# Patient Record
Sex: FEMALE | Race: WHITE | NOT HISPANIC OR LATINO | Employment: FULL TIME | URBAN - METROPOLITAN AREA
[De-identification: names, ages, dates, MRNs, and addresses within clinical notes are randomized per-mention and may not be internally consistent; named-entity substitution may affect disease eponyms.]

---

## 2024-07-24 ENCOUNTER — OFFICE VISIT (OUTPATIENT)
Dept: FAMILY MEDICINE CLINIC | Facility: CLINIC | Age: 44
End: 2024-07-24
Payer: COMMERCIAL

## 2024-07-24 VITALS
HEART RATE: 93 BPM | OXYGEN SATURATION: 96 % | HEIGHT: 67 IN | SYSTOLIC BLOOD PRESSURE: 126 MMHG | BODY MASS INDEX: 34.53 KG/M2 | WEIGHT: 220 LBS | DIASTOLIC BLOOD PRESSURE: 88 MMHG

## 2024-07-24 DIAGNOSIS — R31.9 HEMATURIA, UNSPECIFIED TYPE: ICD-10-CM

## 2024-07-24 DIAGNOSIS — M53.86 DECREASED RANGE OF MOTION OF LUMBAR SPINE: ICD-10-CM

## 2024-07-24 DIAGNOSIS — M62.830 MUSCLE SPASM OF BACK: ICD-10-CM

## 2024-07-24 DIAGNOSIS — Z00.00 HEALTHCARE MAINTENANCE: ICD-10-CM

## 2024-07-24 DIAGNOSIS — Z13.220 SCREENING FOR LIPID DISORDERS: ICD-10-CM

## 2024-07-24 DIAGNOSIS — F32.0 CURRENT MILD EPISODE OF MAJOR DEPRESSIVE DISORDER WITHOUT PRIOR EPISODE (HCC): ICD-10-CM

## 2024-07-24 DIAGNOSIS — M54.50 LOW BACK PAIN, UNSPECIFIED BACK PAIN LATERALITY, UNSPECIFIED CHRONICITY, UNSPECIFIED WHETHER SCIATICA PRESENT: ICD-10-CM

## 2024-07-24 DIAGNOSIS — E66.9 OBESITY (BMI 30-39.9): ICD-10-CM

## 2024-07-24 DIAGNOSIS — Z13.31 POSITIVE DEPRESSION SCREENING: ICD-10-CM

## 2024-07-24 DIAGNOSIS — Z11.4 SCREENING FOR HIV (HUMAN IMMUNODEFICIENCY VIRUS): ICD-10-CM

## 2024-07-24 DIAGNOSIS — Z83.3 FAMILY HISTORY OF DIABETES MELLITUS (DM): Primary | ICD-10-CM

## 2024-07-24 DIAGNOSIS — Z11.59 NEED FOR HEPATITIS C SCREENING TEST: ICD-10-CM

## 2024-07-24 DIAGNOSIS — Z12.31 SCREENING MAMMOGRAM FOR BREAST CANCER: ICD-10-CM

## 2024-07-24 LAB
SL AMB  POCT GLUCOSE, UA: NEGATIVE
SL AMB LEUKOCYTE ESTERASE,UA: NEGATIVE
SL AMB POCT BILIRUBIN,UA: NEGATIVE
SL AMB POCT BLOOD,UA: ABNORMAL
SL AMB POCT CLARITY,UA: ABNORMAL
SL AMB POCT COLOR,UA: YELLOW
SL AMB POCT KETONES,UA: NEGATIVE
SL AMB POCT NITRITE,UA: NEGATIVE
SL AMB POCT PH,UA: 5.5
SL AMB POCT SPECIFIC GRAVITY,UA: >1.03
SL AMB POCT URINE PROTEIN: NEGATIVE
SL AMB POCT UROBILINOGEN: 1

## 2024-07-24 PROCEDURE — 81002 URINALYSIS NONAUTO W/O SCOPE: CPT | Performed by: FAMILY MEDICINE

## 2024-07-24 PROCEDURE — 99205 OFFICE O/P NEW HI 60 MIN: CPT | Performed by: FAMILY MEDICINE

## 2024-07-24 PROCEDURE — 87086 URINE CULTURE/COLONY COUNT: CPT | Performed by: FAMILY MEDICINE

## 2024-07-24 RX ORDER — DICLOFENAC POTASSIUM 50 MG/1
TABLET, FILM COATED ORAL
Qty: 30 TABLET | Refills: 1 | Status: SHIPPED | OUTPATIENT
Start: 2024-07-24

## 2024-07-24 RX ORDER — METHOCARBAMOL 500 MG/1
500 TABLET, FILM COATED ORAL 4 TIMES DAILY
COMMUNITY
Start: 2024-06-24 | End: 2024-07-24

## 2024-07-24 RX ORDER — DULOXETIN HYDROCHLORIDE 30 MG/1
30 CAPSULE, DELAYED RELEASE ORAL DAILY
Qty: 30 CAPSULE | Refills: 5 | Status: SHIPPED | OUTPATIENT
Start: 2024-07-24

## 2024-07-24 RX ORDER — TIZANIDINE 4 MG/1
4 TABLET ORAL EVERY 8 HOURS PRN
Qty: 30 TABLET | Refills: 1 | Status: SHIPPED | OUTPATIENT
Start: 2024-07-24

## 2024-07-24 NOTE — PATIENT INSTRUCTIONS
Complete CT of abdomen pelvis rule out stone  Start Cymbalta 30 mg 1 daily to help mood and muscle pain  Use Cataflam/diclofenac 3 times a day with food as needed for pain  Use tizanidine 4 mg 3 times daily/every 8 hours as needed for muscle spasm.  This may cause drowsiness do not drive or operate machinery until side effect is known  Follow-up physical therapy complete blood work as ordered may hold on physical therapy to see if this is caused by a kidney stone  Complete mammography as ordered  Recheck 2 weeks sooner if needed

## 2024-07-24 NOTE — ASSESSMENT & PLAN NOTE
Discussed pathophysiology of depression.  Will start Cymbalta risk-benefit discussed specially increased risk of suicidal ideation recheck 2 weeks   REPORT GIVEN TO ABHI SOLOMON.

## 2024-07-24 NOTE — PROGRESS NOTES
Ambulatory Visit  Name: Paola Luna      : 1980      MRN: [id number]  Encounter Provider: Dutch Mcgarry DO  Encounter Date: 2024   Encounter department: [facility] PRIMARY CARE  Plan  #1.  Family history of diabetes in mother  Blood work is ordered  2.  BMI 34.46 diet exercise weight loss recommended TSH is ordered  3.  Healthcare maintenance discussed screening for hepatitis C, HIV, lipid, orders placed  4.  Breast cancer screening, mammography ordered  5.  Low back pain  6.  Mild spasm of back  Cataflam as ordered GI side effects discussed  Tizanidine is ordered drowsiness discussed  Lumbar spine x-rays ordered  Refer to physical therapy  Urinalysis completed in office today  7.  Positive depression screen will monitor  8.  MDD, negative SI/HI, will start Cymbalta risk and benefit discussed especially increased risk of suicidal ideation.  Pathophysiology of depression discussed  9  Breast cancer screening, mammography is ordered.  Explained that Cymbalta will help her mood/depression will also help muscle pain  10  Hematuria with above discontinue x-ray will change to CT stone study and check culture  11  Recheck 4 weeks sooner if needed patient to call if not improving the next 1 to 2 weeks      Assessment & Plan   1. Family history of diabetes mellitus (DM)  -     CBC; Future  -     Comprehensive metabolic panel  -     Lipid Panel with Direct LDL reflex  -     TSH, 3rd generation with Free T4 reflex  2. Obesity (BMI 30-39.9)  Assessment & Plan:  BMI 34.46 TSH is ordered  Orders:  -     CBC; Future  -     Comprehensive metabolic panel  -     Lipid Panel with Direct LDL reflex  -     TSH, 3rd generation with Free T4 reflex  3. Healthcare maintenance  Assessment & Plan:  Discussed hepatitis C/HIV/lipid screening, orders placed  Orders:  -     CBC; Future  -     Comprehensive metabolic panel  -     Lipid Panel with Direct LDL reflex  -     TSH, 3rd generation with Free T4 reflex  4.  Screening mammogram for breast cancer  -     Mammo screening bilateral w 3d & cad; Future  -     CBC; Future  -     Comprehensive metabolic panel  -     Lipid Panel with Direct LDL reflex  -     TSH, 3rd generation with Free T4 reflex  5. Low back pain, unspecified back pain laterality, unspecified chronicity, unspecified whether sciatica present  -     POCT urine dip  -     CBC; Future  -     Comprehensive metabolic panel  -     Lipid Panel with Direct LDL reflex  -     TSH, 3rd generation with Free T4 reflex  -     diclofenac potassium (CATAFLAM) 50 mg tablet; Take 1 tablet 3 times daily with food as needed for back pain  -     Ambulatory referral to Physical Therapy; Future  -     CT renal stone study abdomen pelvis wo contrast; Future; Expected date: 07/24/2024  -     Urine culture; Future  -     Urine culture  6. Muscle spasm of back  -     CBC; Future  -     Comprehensive metabolic panel  -     Lipid Panel with Direct LDL reflex  -     TSH, 3rd generation with Free T4 reflex  -     tiZANidine (ZANAFLEX) 4 mg tablet; Take 1 tablet (4 mg total) by mouth every 8 (eight) hours as needed for muscle spasms  -     Ambulatory referral to Physical Therapy; Future  7. Decreased range of motion of lumbar spine  -     CBC; Future  -     Comprehensive metabolic panel  -     Lipid Panel with Direct LDL reflex  -     TSH, 3rd generation with Free T4 reflex  8. Positive depression screening  -     CBC; Future  -     Comprehensive metabolic panel  -     Lipid Panel with Direct LDL reflex  -     TSH, 3rd generation with Free T4 reflex  9. Need for hepatitis C screening test  -     Hepatitis C Antibody; Future  -     CBC; Future  -     Comprehensive metabolic panel  -     Lipid Panel with Direct LDL reflex  -     TSH, 3rd generation with Free T4 reflex  10. Screening for HIV (human immunodeficiency virus)  -     HIV 1/2 AG/AB w Reflex [facility] for 2 yr old and above; Future  -     CBC; Future  -     Comprehensive metabolic panel  -      Lipid Panel with Direct LDL reflex  -     TSH, 3rd generation with Free T4 reflex  11. Screening for lipid disorders  -     CBC; Future  -     Comprehensive metabolic panel  -     Lipid Panel with Direct LDL reflex  -     TSH, 3rd generation with Free T4 reflex  12. Hematuria, unspecified type  -     CT renal stone study abdomen pelvis wo contrast; Future; Expected date: 07/24/2024  -     Urine culture; Future  -     Urine culture  13. Current mild episode of major depressive disorder without prior episode (HCC)  Assessment & Plan:  Discussed pathophysiology of depression.  Will start Cymbalta risk-benefit discussed specially increased risk of suicidal ideation recheck 2 weeks  Orders:  -     DULoxetine (CYMBALTA) 30 mg delayed release capsule; Take 1 capsule (30 mg total) by mouth daily    Depression Screening and Follow-up Plan: Patient's depression screening was positive with a PHQ-2 score of 3. Their PHQ-9 score was 12.       History of Present Illness     Patient here to establish herself as a patient as office.  Patient said low back pain for well over a month.  Patient did see urgent care 2 to 3 weeks ago was placed on Robaxin for few days without relief.  No other workup was done at this point.  Patient's lumbosacral pain is without radiation no leg pain paresthesias numbness.  Patient has no antecedent history of trauma.        Review of Systems   Constitutional: Negative.    HENT: Negative.     Eyes: Negative.    Respiratory: Negative.     Cardiovascular: Negative.    Gastrointestinal: Negative.    Endocrine:        Normal history of diabetes in mother   Musculoskeletal:         HPI   Skin: Negative.    Allergic/Immunologic: Negative.    Neurological:         HPI   Hematological: Negative.    Psychiatric/Behavioral:          Positive depression screen, mildly depressed and tearful       Objective     /88 (BP Location: Right arm, Patient Position: Sitting, Cuff Size: Standard)   Pulse 93   Ht 5'  "7\" (1.702 m)   Wt 99.8 kg (220 lb)   SpO2 96%   BMI 34.46 kg/m²     Physical Exam  Vitals and nursing note reviewed.   Constitutional:       Appearance: Normal appearance. She is obese.   HENT:      Head: Normocephalic and atraumatic.      Right Ear: Tympanic membrane normal.      Left Ear: Tympanic membrane normal.      Nose: Nose normal.      Mouth/Throat:      Mouth: Mucous membranes are moist.      Pharynx: Oropharynx is clear. No oropharyngeal exudate or posterior oropharyngeal erythema.   Eyes:      General: No scleral icterus.        Right eye: No discharge.         Left eye: No discharge.      Extraocular Movements: Extraocular movements intact.      Conjunctiva/sclera: Conjunctivae normal.      Pupils: Pupils are equal, round, and reactive to light.   Neck:      Vascular: No carotid bruit.   Cardiovascular:      Rate and Rhythm: Normal rate and regular rhythm.      Heart sounds: Normal heart sounds.   Pulmonary:      Breath sounds: Normal breath sounds.   Abdominal:      General: Bowel sounds are normal. There is no distension.      Palpations: Abdomen is soft. There is no mass.      Tenderness: There is no abdominal tenderness. There is no right CVA tenderness, left CVA tenderness, guarding or rebound.      Hernia: No hernia is present.   Musculoskeletal:         General: No swelling, tenderness, deformity or signs of injury.      Cervical back: Neck supple. No rigidity or tenderness.      Right lower leg: No edema.      Left lower leg: No edema.      Comments: Positive paravertebral muscle contraction low thoracic lumbar and sacral areas positive mild spasm of erector spinae and latissimus dorsi musculature decreased range of motion with flexion of spine and lumbar area   Lymphadenopathy:      Cervical: No cervical adenopathy.   Skin:     General: Skin is warm and dry.   Neurological:      General: No focal deficit present.      Mental Status: She is alert and oriented to person, place, and time.      " Cranial Nerves: No cranial nerve deficit.      Motor: No weakness.      Gait: Gait normal.   Psychiatric:         Mood and Affect: Mood normal.       Administrative Statements   Depression Screening Follow-up Plan: Patient's depression screening was positive with a PHQ-2 score of 3. Their PHQ-9 score was 12. Patient advised to follow-up with PCP for further management.

## 2024-07-25 ENCOUNTER — APPOINTMENT (OUTPATIENT)
Dept: LAB | Facility: CLINIC | Age: 44
End: 2024-07-25
Payer: COMMERCIAL

## 2024-07-25 ENCOUNTER — HOSPITAL ENCOUNTER (OUTPATIENT)
Dept: CT IMAGING | Facility: HOSPITAL | Age: 44
Discharge: HOME | End: 2024-07-25
Payer: COMMERCIAL

## 2024-07-25 ENCOUNTER — TELEPHONE (OUTPATIENT)
Dept: FAMILY MEDICINE CLINIC | Facility: CLINIC | Age: 44
End: 2024-07-25

## 2024-07-25 DIAGNOSIS — M54.50 LOW BACK PAIN, UNSPECIFIED BACK PAIN LATERALITY, UNSPECIFIED CHRONICITY, UNSPECIFIED WHETHER SCIATICA PRESENT: ICD-10-CM

## 2024-07-25 DIAGNOSIS — Z13.31 POSITIVE DEPRESSION SCREENING: ICD-10-CM

## 2024-07-25 DIAGNOSIS — Z83.3 FAMILY HISTORY OF DIABETES MELLITUS (DM): ICD-10-CM

## 2024-07-25 DIAGNOSIS — Z11.59 NEED FOR HEPATITIS C SCREENING TEST: ICD-10-CM

## 2024-07-25 DIAGNOSIS — R31.9 HEMATURIA, UNSPECIFIED TYPE: ICD-10-CM

## 2024-07-25 DIAGNOSIS — M53.86 DECREASED RANGE OF MOTION OF LUMBAR SPINE: ICD-10-CM

## 2024-07-25 DIAGNOSIS — Z12.31 SCREENING MAMMOGRAM FOR BREAST CANCER: ICD-10-CM

## 2024-07-25 DIAGNOSIS — M62.830 MUSCLE SPASM OF BACK: ICD-10-CM

## 2024-07-25 DIAGNOSIS — Z00.00 HEALTHCARE MAINTENANCE: ICD-10-CM

## 2024-07-25 DIAGNOSIS — Z13.220 SCREENING FOR LIPID DISORDERS: ICD-10-CM

## 2024-07-25 DIAGNOSIS — Z11.4 SCREENING FOR HIV (HUMAN IMMUNODEFICIENCY VIRUS): ICD-10-CM

## 2024-07-25 DIAGNOSIS — E66.9 OBESITY (BMI 30-39.9): ICD-10-CM

## 2024-07-25 LAB
ALBUMIN SERPL BCG-MCNC: 4.1 G/DL (ref 3.5–5)
ALP SERPL-CCNC: 55 U/L (ref 34–104)
ALT SERPL W P-5'-P-CCNC: 31 U/L (ref 7–52)
ANION GAP SERPL CALCULATED.3IONS-SCNC: 5 MMOL/L (ref 4–13)
AST SERPL W P-5'-P-CCNC: 23 U/L (ref 13–39)
BACTERIA UR CULT: NORMAL
BILIRUB SERPL-MCNC: 0.75 MG/DL (ref 0.2–1)
BUN SERPL-MCNC: 7 MG/DL (ref 5–25)
CALCIUM SERPL-MCNC: 8.8 MG/DL (ref 8.4–10.2)
CHLORIDE SERPL-SCNC: 105 MMOL/L (ref 96–108)
CHOLEST SERPL-MCNC: 159 MG/DL
CO2 SERPL-SCNC: 26 MMOL/L (ref 21–32)
CREAT SERPL-MCNC: 0.83 MG/DL (ref 0.6–1.3)
ERYTHROCYTE [DISTWIDTH] IN BLOOD BY AUTOMATED COUNT: 13.2 % (ref 11.6–15.1)
GFR SERPL CREATININE-BSD FRML MDRD: 86 ML/MIN/1.73SQ M
GLUCOSE P FAST SERPL-MCNC: 96 MG/DL (ref 65–99)
HCT VFR BLD AUTO: 44.8 % (ref 34.8–46.1)
HCV AB SER QL: NORMAL
HDLC SERPL-MCNC: 41 MG/DL
HGB BLD-MCNC: 14.5 G/DL (ref 11.5–15.4)
HIV 1+2 AB+HIV1 P24 AG SERPL QL IA: NORMAL
HIV 2 AB SERPL QL IA: NORMAL
HIV1 AB SERPL QL IA: NORMAL
HIV1 P24 AG SERPL QL IA: NORMAL
LDLC SERPL CALC-MCNC: 101 MG/DL (ref 0–100)
MCH RBC QN AUTO: 27.3 PG (ref 26.8–34.3)
MCHC RBC AUTO-ENTMCNC: 32.4 G/DL (ref 31.4–37.4)
MCV RBC AUTO: 84 FL (ref 82–98)
PLATELET # BLD AUTO: 269 THOUSANDS/UL (ref 149–390)
PMV BLD AUTO: 10.5 FL (ref 8.9–12.7)
POTASSIUM SERPL-SCNC: 4 MMOL/L (ref 3.5–5.3)
PROT SERPL-MCNC: 6.7 G/DL (ref 6.4–8.4)
RBC # BLD AUTO: 5.31 MILLION/UL (ref 3.81–5.12)
SODIUM SERPL-SCNC: 136 MMOL/L (ref 135–147)
TRIGL SERPL-MCNC: 83 MG/DL
TSH SERPL DL<=0.05 MIU/L-ACNC: 1.14 UIU/ML (ref 0.45–4.5)
WBC # BLD AUTO: 6.71 THOUSAND/UL (ref 4.31–10.16)

## 2024-07-25 PROCEDURE — 80061 LIPID PANEL: CPT | Performed by: FAMILY MEDICINE

## 2024-07-25 PROCEDURE — 36415 COLL VENOUS BLD VENIPUNCTURE: CPT

## 2024-07-25 PROCEDURE — 85027 COMPLETE CBC AUTOMATED: CPT

## 2024-07-25 PROCEDURE — 86803 HEPATITIS C AB TEST: CPT

## 2024-07-25 PROCEDURE — 87389 HIV-1 AG W/HIV-1&-2 AB AG IA: CPT

## 2024-07-25 PROCEDURE — 80053 COMPREHEN METABOLIC PANEL: CPT | Performed by: FAMILY MEDICINE

## 2024-07-25 PROCEDURE — 84443 ASSAY THYROID STIM HORMONE: CPT | Performed by: FAMILY MEDICINE

## 2024-07-25 PROCEDURE — 74176 CT ABD & PELVIS W/O CONTRAST: CPT

## 2024-07-25 NOTE — TELEPHONE ENCOUNTER
----- Message from Dutch Mcgarry DO sent at 7/25/2024 12:53 PM EDT -----  Patient's CAT scan shows no acute intra-abdominal pathology.  Specifically appendix was normal no kidney stones were noted.  Was noted some mild fatty deposits in liver diet exercise and weight loss will resolve

## 2024-07-25 NOTE — TELEPHONE ENCOUNTER
A. Relayed results to patient as per provider message. Patient expressed understanding and did not have any further questions.

## 2024-07-31 ENCOUNTER — TELEPHONE (OUTPATIENT)
Age: 44
End: 2024-07-31

## 2024-07-31 NOTE — TELEPHONE ENCOUNTER
Reason for call:   [x] Prior Auth  [] Other:     Caller:  [x] Patient  [] Pharmacy  Name:   Address:   Callback Number:     Medication: DULoxetine (CYMBALTA) 30 mg delayed release capsule    Dose/Frequency: Take 1 capsule (30 mg total) by mouth daily      Quantity:  30 capsule     Ordering Provider:   [x] PCP/Provider - Dutch Mcgarry, DO   [] Speciality/Provider -     Has the patient tried other medications and failed? If failed, which medications did they fail?    [] No   [] Yes -     Is the patient's insurance updated in EPIC?   [] Yes   [] No     Is a copy of the patient's insurance scanned in EPIC?   [] Yes   [] No

## 2024-08-02 ENCOUNTER — TELEPHONE (OUTPATIENT)
Age: 44
End: 2024-08-02

## 2024-08-02 DIAGNOSIS — F32.0 CURRENT MILD EPISODE OF MAJOR DEPRESSIVE DISORDER WITHOUT PRIOR EPISODE (HCC): Primary | ICD-10-CM

## 2024-08-02 NOTE — TELEPHONE ENCOUNTER
PA for DULoxetine (CYMBALTA) 30 mg delayed release capsule  SUBMITTED     via    [][employer]KEY   [x][employer]-Case ID #   []Faxed to plan   []Other website   []Phone call Case ID #     Office notes sent, clinical questions answered. Awaiting determination    Turnaround time for your insurance to make a decision on your Prior Authorization can take 7-21 business days.

## 2024-08-05 ENCOUNTER — TELEPHONE (OUTPATIENT)
Dept: FAMILY MEDICINE CLINIC | Facility: CLINIC | Age: 44
End: 2024-08-05

## 2024-08-07 NOTE — TELEPHONE ENCOUNTER
PA for DULoxetine (CYMBALTA) 30 mg delayed release capsule  Denied    Reason:(Screenshot if applicable)        Message sent to office clinical pool Yes    Denial letter scanned into Media Yes    Appeal started No ( Provider will need to decide if appeal is warranted and send clinical documentation to Prior Authorization Team for initiation.)    **Please follow up with your patient regarding denial and next steps**

## 2024-08-08 ENCOUNTER — OFFICE VISIT (OUTPATIENT)
Dept: FAMILY MEDICINE CLINIC | Facility: CLINIC | Age: 44
End: 2024-08-08
Payer: COMMERCIAL

## 2024-08-08 VITALS
TEMPERATURE: 97.8 F | SYSTOLIC BLOOD PRESSURE: 118 MMHG | DIASTOLIC BLOOD PRESSURE: 78 MMHG | OXYGEN SATURATION: 98 % | WEIGHT: 219.13 LBS | BODY MASS INDEX: 34.32 KG/M2 | HEART RATE: 84 BPM

## 2024-08-08 DIAGNOSIS — M62.830 MUSCLE SPASM OF BACK: ICD-10-CM

## 2024-08-08 DIAGNOSIS — Z00.00 HEALTHCARE MAINTENANCE: ICD-10-CM

## 2024-08-08 DIAGNOSIS — F32.0 CURRENT MILD EPISODE OF MAJOR DEPRESSIVE DISORDER WITHOUT PRIOR EPISODE (HCC): ICD-10-CM

## 2024-08-08 DIAGNOSIS — M54.50 LOW BACK PAIN, UNSPECIFIED BACK PAIN LATERALITY, UNSPECIFIED CHRONICITY, UNSPECIFIED WHETHER SCIATICA PRESENT: ICD-10-CM

## 2024-08-08 DIAGNOSIS — E66.9 OBESITY (BMI 30-39.9): ICD-10-CM

## 2024-08-08 DIAGNOSIS — M53.86 DECREASED RANGE OF MOTION OF LUMBAR SPINE: ICD-10-CM

## 2024-08-08 DIAGNOSIS — E78.5 DYSLIPIDEMIA: ICD-10-CM

## 2024-08-08 DIAGNOSIS — R31.9 HEMATURIA, UNSPECIFIED TYPE: Primary | ICD-10-CM

## 2024-08-08 DIAGNOSIS — M47.816 SPONDYLOSIS OF LUMBAR REGION WITHOUT MYELOPATHY OR RADICULOPATHY: ICD-10-CM

## 2024-08-08 DIAGNOSIS — K76.0 FATTY INFILTRATION OF LIVER: ICD-10-CM

## 2024-08-08 PROCEDURE — 99214 OFFICE O/P EST MOD 30 MIN: CPT | Performed by: FAMILY MEDICINE

## 2024-08-08 RX ORDER — ESCITALOPRAM OXALATE 10 MG/1
10 TABLET ORAL DAILY
Qty: 30 TABLET | Refills: 5 | Status: SHIPPED | OUTPATIENT
Start: 2024-08-08 | End: 2025-02-04

## 2024-08-08 NOTE — PROGRESS NOTES
"Ambulatory Visit  Name: Paola Luna      : 1980      MRN: [id number]  Encounter Provider: Dutch Mcgarry DO  Encounter Date: 2024   Encounter department: [facility]    1.  Hematuria  Culture was negative no stones on CT  Patient unable to void in office ordered for patient to drop a urine at the lab anytime in the next few days  2.  Low back pain/muscle spasm of back, improved  3.  DJD L5-S1 on CT  Patient continue Cataflam I recommend patient complete physical therapy  4.  Fatty infiltration of liver, safety discussed diet exercise weight loss recommended  5.  MDD, Cymbalta was not covered by her insurance we were notified earlier today I changed to Lexapro 10 mg daily.  Patient did  medication pharmacy has not started as of yet  6.  BMI 34.32 patient lost 1 pound continue diet exercise weight loss  7.  Healthcare maintenance, HIV and hepatitis screenings are negative  8.  Recheck 4 weeks sooner if needed    9.  Addendum, patient would like a \"note for work\" with restrictions we will restrict her to lifting less than 30 pounds and pushing less than 50 pounds at work    Assessment & Plan   1. Hematuria, unspecified type  -     UA (URINE) with reflex to Scope; Future; Expected date: 2024  2. Low back pain, unspecified back pain laterality, unspecified chronicity, unspecified whether sciatica present  3. Muscle spasm of back  4. Decreased range of motion of lumbar spine  5. Fatty infiltration of liver  Assessment & Plan:  Discussed CT diet exercise weight loss will resolve this condition, LFTs on blood work were normal  6. Current mild episode of major depressive disorder without prior episode (HCC)  Assessment & Plan:  Patient's insurance would not cover Cymbalta changed to Lexapro 10 mg daily patient picked up the medication today has not started  7. Dyslipidemia  Assessment & Plan:  Diet controlled  8. Obesity (BMI 30-39.9)  Assessment & Plan:  BMI 34.32 " patient lost 1 pound continue diet exercise and weight loss  9. Healthcare maintenance  Assessment & Plan:  Hepatitis C and HIV screenings both negative  10. Spondylosis of lumbar region without myelopathy or radiculopathy  Assessment & Plan:  Discussed CT, patient can use Cataflam I recommend patient to complete physical therapy       History of Present Illness     Patient still with occasional low back pain patient unfortunately could not start any medication i.e. Cymbalta because insurance did not cover it we were notified earlier today of this and changed to Lexapro.  Patient did  the medication however is not started as of yet.  Patient did not complete physical therapy as ordered        Review of Systems   Constitutional: Negative.    HENT: Negative.     Eyes: Negative.    Respiratory: Negative.     Cardiovascular: Negative.    Gastrointestinal: Negative.    Endocrine: Negative.    Genitourinary: Negative.    Musculoskeletal:         HPI   Skin: Negative.    Allergic/Immunologic: Negative.    Neurological: Negative.    Hematological: Negative.    Psychiatric/Behavioral:          HPI       Objective     /78 (BP Location: Right arm, Patient Position: Sitting, Cuff Size: Large)   Pulse 84   Temp 97.8 °F (36.6 °C) (Temporal)   Wt 99.4 kg (219 lb 2 oz)   SpO2 98%   BMI 34.32 kg/m²     Physical Exam  Vitals and nursing note reviewed.   Constitutional:       Appearance: Normal appearance.   HENT:      Head: Normocephalic and atraumatic.      Mouth/Throat:      Mouth: Mucous membranes are moist.   Eyes:      General: No scleral icterus.  Neck:      Vascular: No carotid bruit.   Cardiovascular:      Rate and Rhythm: Normal rate and regular rhythm.      Heart sounds: Normal heart sounds.   Pulmonary:      Breath sounds: Normal breath sounds.   Musculoskeletal:         General: No tenderness or deformity.      Cervical back: Neck supple.      Comments: Patient without tenderness or deformity lumbar  sacral spine   Skin:     General: Skin is warm and dry.   Neurological:      General: No focal deficit present.      Mental Status: She is alert.   Psychiatric:         Mood and Affect: Mood normal.       Administrative Statements

## 2024-08-08 NOTE — LETTER
August 8, 2024     Patient: Paola Luna   YOB: 1980   Date of Visit: 8/8/2024       To Whom It May Concern:    It is my medical opinion that Paola Luna  should lift no more than 30 pounds and push no more than 50 pounds until further notice .    If you have any questions or concerns, please don't hesitate to call.         Sincerely,        Dutch Mcgarry,     CC: No Recipients

## 2024-08-08 NOTE — PATIENT INSTRUCTIONS
Start Lexapro/escitalopram 10 mg tablets 1 daily for mood  May use Cataflam and tizanidine as needed for back pain  I recommend physical therapy in addition to help your back pain  Diet exercise weight loss recommended  Recheck 4 weeks sooner if needed

## 2024-08-08 NOTE — ASSESSMENT & PLAN NOTE
Discussed CT diet exercise weight loss will resolve this condition, LFTs on blood work were normal

## 2024-08-08 NOTE — TELEPHONE ENCOUNTER
Please notify patient that her insurance will not cover duloxetine will change to Lexapro 10 mg daily

## 2024-08-08 NOTE — ASSESSMENT & PLAN NOTE
Patient's insurance would not cover Cymbalta changed to Lexapro 10 mg daily patient picked up the medication today has not started

## 2024-08-12 NOTE — TELEPHONE ENCOUNTER
Medication was discontinued by provider on 8/8/2024. Therapy appears to have been changed. If patient still needs this authorization, please re-prescribe the medication as an active medication and send a message back to the prior authorization team.

## 2024-08-23 PROBLEM — Z00.00 HEALTHCARE MAINTENANCE: Status: RESOLVED | Noted: 2024-07-24 | Resolved: 2024-08-23

## 2024-09-05 ENCOUNTER — OFFICE VISIT (OUTPATIENT)
Dept: FAMILY MEDICINE CLINIC | Facility: CLINIC | Age: 44
End: 2024-09-05
Payer: COMMERCIAL

## 2024-09-05 VITALS
HEART RATE: 72 BPM | WEIGHT: 215.5 LBS | DIASTOLIC BLOOD PRESSURE: 76 MMHG | BODY MASS INDEX: 33.75 KG/M2 | OXYGEN SATURATION: 97 % | SYSTOLIC BLOOD PRESSURE: 114 MMHG | TEMPERATURE: 97.4 F

## 2024-09-05 DIAGNOSIS — R31.21 ASYMPTOMATIC MICROSCOPIC HEMATURIA: ICD-10-CM

## 2024-09-05 DIAGNOSIS — F32.0 CURRENT MILD EPISODE OF MAJOR DEPRESSIVE DISORDER WITHOUT PRIOR EPISODE (HCC): ICD-10-CM

## 2024-09-05 DIAGNOSIS — M54.50 LOW BACK PAIN, UNSPECIFIED BACK PAIN LATERALITY, UNSPECIFIED CHRONICITY, UNSPECIFIED WHETHER SCIATICA PRESENT: ICD-10-CM

## 2024-09-05 DIAGNOSIS — M53.86 DECREASED RANGE OF MOTION OF LUMBAR SPINE: ICD-10-CM

## 2024-09-05 DIAGNOSIS — M47.816 SPONDYLOSIS OF LUMBAR REGION WITHOUT MYELOPATHY OR RADICULOPATHY: ICD-10-CM

## 2024-09-05 DIAGNOSIS — E78.5 DYSLIPIDEMIA: ICD-10-CM

## 2024-09-05 DIAGNOSIS — R31.9 HEMATURIA, UNSPECIFIED TYPE: Primary | ICD-10-CM

## 2024-09-05 DIAGNOSIS — K76.0 FATTY INFILTRATION OF LIVER: ICD-10-CM

## 2024-09-05 DIAGNOSIS — M62.830 MUSCLE SPASM OF BACK: ICD-10-CM

## 2024-09-05 LAB
SL AMB  POCT GLUCOSE, UA: NORMAL
SL AMB LEUKOCYTE ESTERASE,UA: NORMAL
SL AMB POCT BILIRUBIN,UA: NORMAL
SL AMB POCT BLOOD,UA: NORMAL
SL AMB POCT CLARITY,UA: NORMAL
SL AMB POCT COLOR,UA: NORMAL
SL AMB POCT KETONES,UA: NORMAL
SL AMB POCT NITRITE,UA: NORMAL
SL AMB POCT PH,UA: 6
SL AMB POCT SPECIFIC GRAVITY,UA: 1.01
SL AMB POCT URINE PROTEIN: NORMAL
SL AMB POCT UROBILINOGEN: 0.2

## 2024-09-05 PROCEDURE — 81002 URINALYSIS NONAUTO W/O SCOPE: CPT | Performed by: FAMILY MEDICINE

## 2024-09-05 PROCEDURE — 99214 OFFICE O/P EST MOD 30 MIN: CPT | Performed by: FAMILY MEDICINE

## 2024-09-05 RX ORDER — MELOXICAM 15 MG/1
15 TABLET ORAL DAILY PRN
Qty: 30 TABLET | Refills: 3 | Status: SHIPPED | OUTPATIENT
Start: 2024-09-05

## 2024-09-05 NOTE — PROGRESS NOTES
"Ambulatory Visit  Name: Paola Luna      : 1980      MRN: [id number]  Encounter Provider: Dutch Mcgarry DO  Encounter Date: 2024   Encounter department: [facility]    1.  Hematuria, repeat urinalysis still has blood, CT stone study was negative will consult urology for evaluation #2.  Fatty infiltration liver, CT discussed, diet exercise weight loss recommended LFTs are normal  3.  Arthritic narrowing lumbar spine/spondylosis/low back pain/muscle spasm\decreased range of motion lumbar spine  CT was discussed  Patient states she is \"60%\" better  Discontinue diclofenac  Will start meloxicam 15 mg daily GI side effects discussed  Refer to physical therapy  4.  MDD, not worse but not better will continue Lexapro 10 mg daily and recheck in 4 weeks  5.  Dyslipidemia increase exercise to increase HDL  6.  Recheck 4 weeks sooner if needed      Assessment & Plan   1. Hematuria, unspecified type  -     POCT urine dip  -     Ambulatory Referral to Urology; Future  2. Fatty infiltration of liver  Assessment & Plan:  Discussed LFTs are normal diet exercise weight loss recommended  3. Spondylosis of lumbar region without myelopathy or radiculopathy  Assessment & Plan:  Some narrowing L5-S1 will discontinue diclofenac changed to meloxicam and refer to physical therapy  Orders:  -     meloxicam (MOBIC) 15 mg tablet; Take 1 tablet (15 mg total) by mouth daily as needed for moderate pain  -     Ambulatory Referral to Physical Therapy; Future  4. Current mild episode of major depressive disorder without prior episode (HCC)  Assessment & Plan:  Not worse but not better will recheck in 4 weeks  5. Dyslipidemia  Assessment & Plan:  Increase exercise to increase HDL  6. Asymptomatic microscopic hematuria  Assessment & Plan:  CT stone study was negative repeat UA in office shows still with hematuria, refer to urology for evaluation  7. Low back pain, unspecified back pain laterality, unspecified " "chronicity, unspecified whether sciatica present  -     meloxicam (MOBIC) 15 mg tablet; Take 1 tablet (15 mg total) by mouth daily as needed for moderate pain  -     Ambulatory Referral to Physical Therapy; Future  8. Muscle spasm of back  -     Ambulatory Referral to Physical Therapy; Future  9. Decreased range of motion of lumbar spine  -     Ambulatory Referral to Physical Therapy; Future       History of Present Illness     Patient feels her back is \"60%\" better.  Patient did not complete urinalysis will do urine dip in office.  Patient's mood is stable not better but not worse.  Blood work and CT discussed with patient        Review of Systems   Constitutional: Negative.    HENT: Negative.     Eyes: Negative.    Respiratory: Negative.     Cardiovascular: Negative.    Gastrointestinal: Negative.    Endocrine: Negative.    Genitourinary:         HPI   Musculoskeletal:         HPI   Skin: Negative.    Allergic/Immunologic: Negative.    Neurological: Negative.    Hematological: Negative.    Psychiatric/Behavioral:          HPI       Objective     /76 (BP Location: Right arm, Patient Position: Sitting, Cuff Size: Large)   Pulse 72   Temp (!) 97.4 °F (36.3 °C) (Temporal)   Wt 97.8 kg (215 lb 8 oz)   SpO2 97%   BMI 33.75 kg/m²     Physical Exam  Vitals and nursing note reviewed.   Constitutional:       Appearance: Normal appearance.   HENT:      Head: Normocephalic and atraumatic.      Mouth/Throat:      Mouth: Mucous membranes are moist.   Eyes:      General: No scleral icterus.  Cardiovascular:      Rate and Rhythm: Normal rate and regular rhythm.      Heart sounds: Normal heart sounds.   Pulmonary:      Effort: Pulmonary effort is normal.      Breath sounds: Normal breath sounds.   Abdominal:      General: Bowel sounds are normal.      Palpations: Abdomen is soft.      Tenderness: There is no abdominal tenderness. There is no right CVA tenderness or left CVA tenderness.   Musculoskeletal:         " General: No tenderness or deformity.      Cervical back: Neck supple.      Comments: Lumbosacral spine without deformity or point tenderness seems to be decreased mild spasm   Skin:     General: Skin is warm and dry.   Neurological:      General: No focal deficit present.      Mental Status: She is alert.   Psychiatric:         Mood and Affect: Mood normal.       Administrative Statements

## 2024-09-05 NOTE — ASSESSMENT & PLAN NOTE
Some narrowing L5-S1 will discontinue diclofenac changed to meloxicam and refer to physical therapy

## 2024-09-05 NOTE — ASSESSMENT & PLAN NOTE
CT stone study was negative repeat UA in office shows still with hematuria, refer to urology for evaluation

## 2024-09-05 NOTE — PATIENT INSTRUCTIONS
Discontinue diclofenac  Start meloxicam 15 mg 1 daily with food  Continue your tizanidine as needed for muscle spasm or back pain  Follow-up physical therapy for back pain  Follow-up with urology for evaluation of blood in urine  Recheck 4 weeks sooner if needed

## 2024-09-18 ENCOUNTER — TELEPHONE (OUTPATIENT)
Age: 44
End: 2024-09-18

## 2024-09-18 NOTE — TELEPHONE ENCOUNTER
1/2 weeks post bilateral upper lid ptosis and bilateral transconjunctival lower lid blepharoplasty.  He has had significant chemosis.  I saw him several days ago we gave steroid eyedrops fees been massaging, elevating and using ice.  He feels that his eyes are much better.  He has been using ointment on the area.  He had significant chemosis initially and this has contributed also by the upper eyelid surgery and the fact that he uses a CPAP machine every night.  He feels that his eyes are less irritated.  He uses ointment religiously every night is very blurry when he uses this.    On exam today, there is much less chemosis but there is still scleral injection.  He shows moderate edema of the skin with redundancy and thickness of the eyelid skin.  His eyelid position is excellent bilaterally.    The conjunctiva shows significant erythema and slight fullness the chemosis much better.  The sclera over our injected.    Overall we talked about again massaging ice compresses ointment moisture but I will start antibiotic went eyedrops.  He will use fees 4 times a day to prevent any cross contamination of the area.  I will see him back in several days and watch his progress.  He does not have any cloudy vision or visual changes.  I do not expect corneal ulceration or severe irritation as he has been keeping his eyes moist and there is no significant mucus or irritation in the area.  However he will either come in on Monday or sent a picture and continues follow his progress.  We will also watch the extra skin on his eyelids.  He knows I will evaluate area continuously.  All questions are answered he was pleased with the discussion.   New Patient    What is the reason for the patient’s appointment?:patient called stating she was referred to see Urology for Hematuria.      Patient scheduled with Elva on 10/31/24.    What office location does the patient prefer?:[place]     Does patient have Imaging/Lab Results:    Have patient records been requested?:  If No, are the records showing in Epic:       INSURANCE:   Do we accept the patient's insurance or is the patient Self-Pay?:    Insurance Provider:[employer]   Plan Type/Number:   Member ID#:       HISTORY:   Has the patient had any previous Urologist(s)?:no     Was the patient seen in the ED?:    Has the patient had any outside testing done?:    Does the patient have a personal history of cancer?:

## 2024-10-31 ENCOUNTER — CONSULT (OUTPATIENT)
Dept: UROLOGY | Facility: CLINIC | Age: 44
End: 2024-10-31
Payer: COMMERCIAL

## 2024-10-31 VITALS
WEIGHT: 215 LBS | HEART RATE: 75 BPM | OXYGEN SATURATION: 97 % | DIASTOLIC BLOOD PRESSURE: 80 MMHG | HEIGHT: 67 IN | SYSTOLIC BLOOD PRESSURE: 118 MMHG | BODY MASS INDEX: 33.74 KG/M2

## 2024-10-31 DIAGNOSIS — R31.9 HEMATURIA, UNSPECIFIED TYPE: Primary | ICD-10-CM

## 2024-10-31 LAB
BACTERIA UR QL AUTO: ABNORMAL /HPF
BILIRUB UR QL STRIP: NEGATIVE
CLARITY UR: CLEAR
COLOR UR: YELLOW
GLUCOSE UR STRIP-MCNC: NEGATIVE MG/DL
HGB UR QL STRIP.AUTO: NEGATIVE
KETONES UR STRIP-MCNC: NEGATIVE MG/DL
LEUKOCYTE ESTERASE UR QL STRIP: NEGATIVE
NITRITE UR QL STRIP: NEGATIVE
NON-SQ EPI CELLS URNS QL MICRO: ABNORMAL /HPF
PH UR STRIP.AUTO: 6.5 [PH]
POST-VOID RESIDUAL VOLUME, ML POC: 10 ML
PROT UR STRIP-MCNC: ABNORMAL MG/DL
RBC #/AREA URNS AUTO: ABNORMAL /HPF
SL AMB  POCT GLUCOSE, UA: NORMAL
SL AMB LEUKOCYTE ESTERASE,UA: NORMAL
SL AMB POCT BILIRUBIN,UA: NORMAL
SL AMB POCT BLOOD,UA: NORMAL
SL AMB POCT CLARITY,UA: CLEAR
SL AMB POCT COLOR,UA: YELLOW
SL AMB POCT KETONES,UA: NORMAL
SL AMB POCT NITRITE,UA: NORMAL
SL AMB POCT PH,UA: 5
SL AMB POCT SPECIFIC GRAVITY,UA: 1.01
SL AMB POCT URINE PROTEIN: NORMAL
SL AMB POCT UROBILINOGEN: 0.2
SP GR UR STRIP.AUTO: 1.02 (ref 1–1.03)
UROBILINOGEN UR STRIP-ACNC: <2 MG/DL
WBC #/AREA URNS AUTO: ABNORMAL /HPF

## 2024-10-31 PROCEDURE — 81001 URINALYSIS AUTO W/SCOPE: CPT

## 2024-10-31 PROCEDURE — 81002 URINALYSIS NONAUTO W/O SCOPE: CPT

## 2024-10-31 PROCEDURE — 99203 OFFICE O/P NEW LOW 30 MIN: CPT

## 2024-10-31 PROCEDURE — 51798 US URINE CAPACITY MEASURE: CPT

## 2024-10-31 NOTE — PROGRESS NOTES
"10/31/2024      Chief Complaint   Patient presents with    Microhematuria         Assessment and Plan    44 y.o. female managed by NEW PATIENT    Microscopic Hematuria  -PVR 10 mls.  -Denies family hx of  malignancy. No hx of smoking.  -urine dip negative for leukocytes and nitrites. Trace blood. Will send this out for micro to further determine rbcs/hpf. We discussed that 1-2 rbcs/hpf is considered negative for hematuria. We will be in contact with her results. If urine micro positive, patient will return for cystoscopy.   -All questions addressed        History of Present Illness  Paola Luna is a 44 y.o. female here for evaluation of microscopic hematuria. She has a pmhx of depression, dsylipidemia, fatty liver, spondylosis of lumbar region.     No previous urine micros on file. Previous urine dips positive for blood.     CT stone study unremarkable (7/25/24).     No hx of smoking or family hx of bladder cancer      Denies urinary symptoms. No voiding complaints        Review of Systems   Constitutional:  Negative for chills and fever.   HENT:  Negative for ear pain and sore throat.    Eyes:  Negative for pain and visual disturbance.   Respiratory:  Negative for cough and shortness of breath.    Cardiovascular:  Negative for chest pain and palpitations.   Gastrointestinal:  Negative for abdominal pain, nausea and vomiting.   Genitourinary:  Negative for difficulty urinating, dysuria, flank pain, frequency, hematuria and urgency.   Musculoskeletal:  Negative for arthralgias and back pain.   Skin:  Negative for color change and rash.   Neurological:  Negative for seizures and syncope.   All other systems reviewed and are negative.               Vitals  Vitals:    10/31/24 0759   BP: 118/80   BP Location: Left arm   Patient Position: Sitting   Cuff Size: Adult   Pulse: 75   SpO2: 97%   Weight: 97.5 kg (215 lb)   Height: 5' 7\" (1.702 m)       Physical Exam  Constitutional:       Appearance: Normal appearance. "   HENT:      Head: Normocephalic.   Eyes:      Extraocular Movements: Extraocular movements intact.      Pupils: Pupils are equal, round, and reactive to light.   Pulmonary:      Effort: Pulmonary effort is normal. No respiratory distress.   Musculoskeletal:         General: Normal range of motion.      Cervical back: Normal range of motion.   Neurological:      Mental Status: She is alert and oriented to person, place, and time.   Psychiatric:         Mood and Affect: Mood normal.         Behavior: Behavior normal.         Thought Content: Thought content normal.         Judgment: Judgment normal.           Past History  History reviewed. No pertinent past medical history.  Social History     Socioeconomic History    Marital status: /Civil Union     Spouse name: None    Number of children: None    Years of education: None    Highest education level: None   Occupational History    None   Tobacco Use    Smoking status: Never    Smokeless tobacco: Never   Vaping Use    Vaping status: Never Used   Substance and Sexual Activity    Alcohol use: Never    Drug use: Never    Sexual activity: None   Other Topics Concern    None   Social History Narrative    None     Social Determinants of Health     Financial Resource Strain: Not on file   Food Insecurity: Not on file   Transportation Needs: Not on file   Physical Activity: Not on file   Stress: Not on file   Social Connections: Not on file   Intimate Partner Violence: Not on file   Housing Stability: Not on file     Social History     Tobacco Use   Smoking Status Never   Smokeless Tobacco Never     Family History   Problem Relation Age of Onset    Diabetes Mother        The following portions of the patient's history were reviewed and updated as appropriate: allergies, current medications, past medical history, past social history, past surgical history and problem list.    Results  No results found for this or any previous visit (from the past 1 hour(s)).]  No  "results found for: \"PSA\"  Lab Results   Component Value Date    CALCIUM 8.8 07/25/2024    K 4.0 07/25/2024    CO2 26 07/25/2024     07/25/2024    BUN 7 07/25/2024    CREATININE 0.83 07/25/2024     Lab Results   Component Value Date    WBC 6.71 07/25/2024    HGB 14.5 07/25/2024    HCT 44.8 07/25/2024    MCV 84 07/25/2024     07/25/2024       PRISCILLA José  "

## 2024-12-28 DIAGNOSIS — M54.50 LOW BACK PAIN, UNSPECIFIED BACK PAIN LATERALITY, UNSPECIFIED CHRONICITY, UNSPECIFIED WHETHER SCIATICA PRESENT: ICD-10-CM

## 2024-12-28 DIAGNOSIS — M47.816 SPONDYLOSIS OF LUMBAR REGION WITHOUT MYELOPATHY OR RADICULOPATHY: ICD-10-CM

## 2024-12-30 RX ORDER — MELOXICAM 15 MG/1
15 TABLET ORAL DAILY PRN
Qty: 30 TABLET | Refills: 3 | Status: SHIPPED | OUTPATIENT
Start: 2024-12-30

## 2025-02-06 DIAGNOSIS — F32.0 CURRENT MILD EPISODE OF MAJOR DEPRESSIVE DISORDER WITHOUT PRIOR EPISODE (HCC): ICD-10-CM

## 2025-02-07 RX ORDER — ESCITALOPRAM OXALATE 10 MG/1
10 TABLET ORAL DAILY
Qty: 30 TABLET | Refills: 5 | Status: SHIPPED | OUTPATIENT
Start: 2025-02-07 | End: 2025-08-06

## 2025-03-05 ENCOUNTER — OFFICE VISIT (OUTPATIENT)
Dept: FAMILY MEDICINE CLINIC | Facility: CLINIC | Age: 45
End: 2025-03-05
Payer: COMMERCIAL

## 2025-03-05 VITALS
WEIGHT: 212 LBS | SYSTOLIC BLOOD PRESSURE: 120 MMHG | OXYGEN SATURATION: 98 % | HEART RATE: 67 BPM | BODY MASS INDEX: 33.27 KG/M2 | DIASTOLIC BLOOD PRESSURE: 86 MMHG | HEIGHT: 67 IN

## 2025-03-05 DIAGNOSIS — F32.0 CURRENT MILD EPISODE OF MAJOR DEPRESSIVE DISORDER WITHOUT PRIOR EPISODE (HCC): ICD-10-CM

## 2025-03-05 DIAGNOSIS — K76.0 FATTY INFILTRATION OF LIVER: ICD-10-CM

## 2025-03-05 DIAGNOSIS — E78.5 DYSLIPIDEMIA: ICD-10-CM

## 2025-03-05 DIAGNOSIS — R20.2 PARESTHESIA OF FOOT: ICD-10-CM

## 2025-03-05 DIAGNOSIS — M25.572 LEFT ANKLE PAIN, UNSPECIFIED CHRONICITY: Primary | ICD-10-CM

## 2025-03-05 DIAGNOSIS — M79.2 NEURITIS: ICD-10-CM

## 2025-03-05 PROCEDURE — 99214 OFFICE O/P EST MOD 30 MIN: CPT | Performed by: FAMILY MEDICINE

## 2025-03-05 RX ORDER — VITAMIN B COMPLEX
1 CAPSULE ORAL DAILY
COMMUNITY

## 2025-03-05 NOTE — PROGRESS NOTES
Name: Paola Luna      : 1980      MRN: [id number]  Encounter Provider: Dutch Mcgarry DO  Encounter Date: 3/5/2025   Encounter department: [facility] PRIMARY CARE  :  Assessment & Plan  Left ankle pain, unspecified chronicity  X-ray ordered  I recommend meloxicam 15 mg daily with food for 1 week  Orders:  •  XR ankle 3+ vw left; Future  •  CBC; Future  •  Comprehensive metabolic panel  •  Lipid Panel with Direct LDL reflex  •  TSH, 3rd generation with Free T4 reflex  •  Vitamin B12; Future  •  Vitamin D 25 hydroxy; Future  •  Folate; Future  •  Lyme Total AB W Reflex to IGM/IGG; Future  •  RF Screen w/ Reflex to Titer  •  Uric acid    Paresthesia of foot  Blood work ordered  Vitamin B complex daily  Orders:  •  XR ankle 3+ vw left; Future  •  CBC; Future  •  Comprehensive metabolic panel  •  Lipid Panel with Direct LDL reflex  •  TSH, 3rd generation with Free T4 reflex  •  Vitamin B12; Future  •  Vitamin D 25 hydroxy; Future  •  Folate; Future  •  Lyme Total AB W Reflex to IGM/IGG; Future  •  RF Screen w/ Reflex to Titer  •  Uric acid    Neuritis  As above  Orders:  •  XR ankle 3+ vw left; Future  •  CBC; Future  •  Comprehensive metabolic panel  •  Lipid Panel with Direct LDL reflex  •  TSH, 3rd generation with Free T4 reflex  •  Vitamin B12; Future  •  Vitamin D 25 hydroxy; Future  •  Folate; Future  •  Lyme Total AB W Reflex to IGM/IGG; Future  •  RF Screen w/ Reflex to Titer  •  Uric acid    Fatty infiltration of liver  Blood work ordered  Orders:  •  XR ankle 3+ vw left; Future  •  CBC; Future  •  Comprehensive metabolic panel  •  Lipid Panel with Direct LDL reflex  •  TSH, 3rd generation with Free T4 reflex  •  Vitamin B12; Future  •  Vitamin D 25 hydroxy; Future  •  Folate; Future  •  Lyme Total AB W Reflex to IGM/IGG; Future  •  RF Screen w/ Reflex to Titer  •  Uric acid    Current mild episode of major depressive disorder without prior episode (HCC)  In remission on Lexapro 10 mg  "daily  Orders:  •  XR ankle 3+ vw left; Future  •  CBC; Future  •  Comprehensive metabolic panel  •  Lipid Panel with Direct LDL reflex  •  TSH, 3rd generation with Free T4 reflex  •  Vitamin B12; Future  •  Vitamin D 25 hydroxy; Future  •  Folate; Future  •  Lyme Total AB W Reflex to IGM/IGG; Future  •  RF Screen w/ Reflex to Titer  •  Uric acid    Dyslipidemia  Blood work ordered  Orders:  •  XR ankle 3+ vw left; Future  •  CBC; Future  •  Comprehensive metabolic panel  •  Lipid Panel with Direct LDL reflex  •  TSH, 3rd generation with Free T4 reflex  •  Vitamin B12; Future  •  Vitamin D 25 hydroxy; Future  •  Folate; Future  •  Lyme Total AB W Reflex to IGM/IGG; Future  •  RF Screen w/ Reflex to Titer  •  Uric acid           History of Present Illness   For the past couple weeks patient says some left ankle pain usually stays at the ankle occasionally radiates proximal.  No history of trauma no rash.  Over the past 2 days patient is noticed that the dorsum of her left foot gets \"tingly and numb\".  No weakness.      Review of Systems   Constitutional: Negative.    HENT: Negative.     Eyes: Negative.    Respiratory: Negative.     Cardiovascular: Negative.    Gastrointestinal: Negative.    Endocrine: Negative.    Genitourinary: Negative.    Musculoskeletal:         HPI   Skin:  Negative for rash.   Allergic/Immunologic: Negative.    Neurological:         HPI   Hematological: Negative.    Psychiatric/Behavioral: Negative.         Objective   /86 (BP Location: Right arm, Patient Position: Sitting, Cuff Size: Adult)   Pulse 67   Ht 5' 7\" (1.702 m)   Wt 96.2 kg (212 lb)   SpO2 98%   BMI 33.20 kg/m²      Physical Exam  Vitals and nursing note reviewed.   Constitutional:       Appearance: Normal appearance.   HENT:      Head: Normocephalic and atraumatic.      Mouth/Throat:      Mouth: Mucous membranes are moist.   Cardiovascular:      Rate and Rhythm: Normal rate and regular rhythm.      Pulses: Normal pulses. "      Heart sounds: Normal heart sounds.   Pulmonary:      Effort: Pulmonary effort is normal.      Breath sounds: Normal breath sounds.   Musculoskeletal:         General: Tenderness present. No swelling or deformity.      Cervical back: No rigidity.      Comments: Mildly tender left medial malleolar area negative gross deformity negative Tinel's at ankle   Skin:     General: Skin is warm and dry.      Capillary Refill: Capillary refill takes less than 2 seconds.      Findings: No erythema.   Neurological:      General: No focal deficit present.      Mental Status: She is alert.      Sensory: No sensory deficit.      Motor: No weakness.   Psychiatric:         Mood and Affect: Mood normal.

## 2025-03-05 NOTE — PATIENT INSTRUCTIONS
Complete x-ray of left ankle  Complete blood work, fasting  Take meloxicam 15 mg daily with food for 1 week  Start vitamin B complex daily  Recheck 1 week

## 2025-03-05 NOTE — ASSESSMENT & PLAN NOTE
In remission on Lexapro 10 mg daily  Orders:  •  XR ankle 3+ vw left; Future  •  CBC; Future  •  Comprehensive metabolic panel  •  Lipid Panel with Direct LDL reflex  •  TSH, 3rd generation with Free T4 reflex  •  Vitamin B12; Future  •  Vitamin D 25 hydroxy; Future  •  Folate; Future  •  Lyme Total AB W Reflex to IGM/IGG; Future  •  RF Screen w/ Reflex to Titer  •  Uric acid   Spoke with the pt's mom  Informed her that the pt's Speech Therapy referral has been authorized and is currently available in the computer system.   The [place] speech therapy location will be able to see the referral in the the computer system  Tequila rudd

## 2025-03-05 NOTE — ASSESSMENT & PLAN NOTE
Blood work ordered  Orders:  •  XR ankle 3+ vw left; Future  •  CBC; Future  •  Comprehensive metabolic panel  •  Lipid Panel with Direct LDL reflex  •  TSH, 3rd generation with Free T4 reflex  •  Vitamin B12; Future  •  Vitamin D 25 hydroxy; Future  •  Folate; Future  •  Lyme Total AB W Reflex to IGM/IGG; Future  •  RF Screen w/ Reflex to Titer  •  Uric acid

## 2025-03-07 ENCOUNTER — HOSPITAL ENCOUNTER (OUTPATIENT)
Dept: RADIOLOGY | Facility: HOSPITAL | Age: 45
End: 2025-03-07
Payer: COMMERCIAL

## 2025-03-07 ENCOUNTER — APPOINTMENT (OUTPATIENT)
Dept: LAB | Facility: HOSPITAL | Age: 45
End: 2025-03-07
Payer: COMMERCIAL

## 2025-03-07 DIAGNOSIS — M79.2 NEURITIS: ICD-10-CM

## 2025-03-07 DIAGNOSIS — M25.572 LEFT ANKLE PAIN, UNSPECIFIED CHRONICITY: ICD-10-CM

## 2025-03-07 DIAGNOSIS — E78.5 DYSLIPIDEMIA: ICD-10-CM

## 2025-03-07 DIAGNOSIS — F32.0 CURRENT MILD EPISODE OF MAJOR DEPRESSIVE DISORDER WITHOUT PRIOR EPISODE (HCC): ICD-10-CM

## 2025-03-07 DIAGNOSIS — R20.2 PARESTHESIA OF FOOT: ICD-10-CM

## 2025-03-07 DIAGNOSIS — K76.0 FATTY INFILTRATION OF LIVER: ICD-10-CM

## 2025-03-07 LAB
25(OH)D3 SERPL-MCNC: 7.3 NG/ML (ref 30–100)
ALBUMIN SERPL BCG-MCNC: 4.6 G/DL (ref 3.5–5)
ALP SERPL-CCNC: 50 U/L (ref 34–104)
ALT SERPL W P-5'-P-CCNC: 15 U/L (ref 7–52)
ANION GAP SERPL CALCULATED.3IONS-SCNC: 6 MMOL/L (ref 4–13)
AST SERPL W P-5'-P-CCNC: 16 U/L (ref 13–39)
B BURGDOR IGG+IGM SER QL IA: NEGATIVE
BILIRUB SERPL-MCNC: 0.93 MG/DL (ref 0.2–1)
BUN SERPL-MCNC: 13 MG/DL (ref 5–25)
CALCIUM SERPL-MCNC: 9.2 MG/DL (ref 8.4–10.2)
CHLORIDE SERPL-SCNC: 105 MMOL/L (ref 96–108)
CHOLEST SERPL-MCNC: 158 MG/DL (ref ?–200)
CO2 SERPL-SCNC: 27 MMOL/L (ref 21–32)
CREAT SERPL-MCNC: 0.94 MG/DL (ref 0.6–1.3)
ERYTHROCYTE [DISTWIDTH] IN BLOOD BY AUTOMATED COUNT: 13.1 % (ref 11.6–15.1)
FOLATE SERPL-MCNC: 10.2 NG/ML
GFR SERPL CREATININE-BSD FRML MDRD: 74 ML/MIN/1.73SQ M
GLUCOSE P FAST SERPL-MCNC: 90 MG/DL (ref 65–99)
HCT VFR BLD AUTO: 45.9 % (ref 34.8–46.1)
HDLC SERPL-MCNC: 44 MG/DL
HGB BLD-MCNC: 14.7 G/DL (ref 11.5–15.4)
LDLC SERPL CALC-MCNC: 97 MG/DL (ref 0–100)
MCH RBC QN AUTO: 27.5 PG (ref 26.8–34.3)
MCHC RBC AUTO-ENTMCNC: 32 G/DL (ref 31.4–37.4)
MCV RBC AUTO: 86 FL (ref 82–98)
PLATELET # BLD AUTO: 281 THOUSANDS/UL (ref 149–390)
PMV BLD AUTO: 9.6 FL (ref 8.9–12.7)
POTASSIUM SERPL-SCNC: 4.3 MMOL/L (ref 3.5–5.3)
PROT SERPL-MCNC: 6.9 G/DL (ref 6.4–8.4)
RBC # BLD AUTO: 5.35 MILLION/UL (ref 3.81–5.12)
SODIUM SERPL-SCNC: 138 MMOL/L (ref 135–147)
TRIGL SERPL-MCNC: 83 MG/DL (ref ?–150)
TSH SERPL DL<=0.05 MIU/L-ACNC: 1.93 UIU/ML (ref 0.45–4.5)
URATE SERPL-MCNC: 4.5 MG/DL (ref 2–7.5)
VIT B12 SERPL-MCNC: 248 PG/ML (ref 180–914)
WBC # BLD AUTO: 6.91 THOUSAND/UL (ref 4.31–10.16)

## 2025-03-07 PROCEDURE — 84443 ASSAY THYROID STIM HORMONE: CPT | Performed by: FAMILY MEDICINE

## 2025-03-07 PROCEDURE — 82607 VITAMIN B-12: CPT

## 2025-03-07 PROCEDURE — 84550 ASSAY OF BLOOD/URIC ACID: CPT | Performed by: FAMILY MEDICINE

## 2025-03-07 PROCEDURE — 86430 RHEUMATOID FACTOR TEST QUAL: CPT | Performed by: FAMILY MEDICINE

## 2025-03-07 PROCEDURE — 82746 ASSAY OF FOLIC ACID SERUM: CPT

## 2025-03-07 PROCEDURE — 80053 COMPREHEN METABOLIC PANEL: CPT | Performed by: FAMILY MEDICINE

## 2025-03-07 PROCEDURE — 85027 COMPLETE CBC AUTOMATED: CPT

## 2025-03-07 PROCEDURE — 82306 VITAMIN D 25 HYDROXY: CPT

## 2025-03-07 PROCEDURE — 36415 COLL VENOUS BLD VENIPUNCTURE: CPT

## 2025-03-07 PROCEDURE — 80061 LIPID PANEL: CPT | Performed by: FAMILY MEDICINE

## 2025-03-07 PROCEDURE — 73610 X-RAY EXAM OF ANKLE: CPT

## 2025-03-07 PROCEDURE — 86618 LYME DISEASE ANTIBODY: CPT

## 2025-03-09 LAB — RHEUMATOID FACT SER QL LA: NEGATIVE

## 2025-03-10 ENCOUNTER — RESULTS FOLLOW-UP (OUTPATIENT)
Dept: FAMILY MEDICINE CLINIC | Facility: CLINIC | Age: 45
End: 2025-03-10

## 2025-03-12 ENCOUNTER — OFFICE VISIT (OUTPATIENT)
Dept: FAMILY MEDICINE CLINIC | Facility: CLINIC | Age: 45
End: 2025-03-12
Payer: COMMERCIAL

## 2025-03-12 VITALS
DIASTOLIC BLOOD PRESSURE: 86 MMHG | HEIGHT: 67 IN | WEIGHT: 214 LBS | OXYGEN SATURATION: 98 % | BODY MASS INDEX: 33.59 KG/M2 | SYSTOLIC BLOOD PRESSURE: 118 MMHG | HEART RATE: 88 BPM

## 2025-03-12 DIAGNOSIS — F32.0 CURRENT MILD EPISODE OF MAJOR DEPRESSIVE DISORDER WITHOUT PRIOR EPISODE (HCC): ICD-10-CM

## 2025-03-12 DIAGNOSIS — E78.5 DYSLIPIDEMIA: ICD-10-CM

## 2025-03-12 DIAGNOSIS — K76.0 FATTY INFILTRATION OF LIVER: ICD-10-CM

## 2025-03-12 DIAGNOSIS — E55.9 VITAMIN D DEFICIENCY: Primary | ICD-10-CM

## 2025-03-12 DIAGNOSIS — M25.572 LEFT ANKLE PAIN, UNSPECIFIED CHRONICITY: ICD-10-CM

## 2025-03-12 DIAGNOSIS — Z12.31 ENCOUNTER FOR SCREENING MAMMOGRAM FOR BREAST CANCER: ICD-10-CM

## 2025-03-12 PROCEDURE — 99214 OFFICE O/P EST MOD 30 MIN: CPT | Performed by: FAMILY MEDICINE

## 2025-03-12 RX ORDER — ERGOCALCIFEROL 1.25 MG/1
CAPSULE, LIQUID FILLED ORAL
Qty: 16 CAPSULE | Refills: 0 | Status: SHIPPED | OUTPATIENT
Start: 2025-03-12

## 2025-03-12 NOTE — PROGRESS NOTES
"Name: Paola Luna      : 1980      MRN: [id number]  Encounter Provider: Dutch Mcgarry DO  Encounter Date: 3/12/2025   Encounter department: [facility] PRIMARY [facility]ARE  Return in 6 months sooner if needed  :  Assessment & Plan  Vitamin D deficiency  Prescription for vitamin D 50,000 units twice weekly for 8 weeks was given  Check vitamin D level 8 weeks  Orders:  •  ergocalciferol (VITAMIN D2) 50,000 units; Take 1 capsule twice weekly for 8 weeks  •  Vitamin D 25 hydroxy; Future    Encounter for screening mammogram for breast cancer  Mammogram ordered  Orders:  •  Mammo screening bilateral w 3d and cad; Future    Fatty infiltration of liver  LFTs are normal         Current mild episode of major depressive disorder without prior episode (HCC)  In remission continue Lexapro 10 mg daily       Dyslipidemia  Increase exercise increase HDL       Left ankle pain, unspecified chronicity  X-ray was normal  Consult podiatry, Dr. Sosa  Orders:  •  Ambulatory Referral to Podiatry; Future           History of Present Illness   Left ankle still painful but slowly improving, blood work and x-ray were discussed      Review of Systems   Constitutional: Negative.    HENT: Negative.     Eyes: Negative.    Respiratory: Negative.     Cardiovascular: Negative.    Gastrointestinal: Negative.    Endocrine: Negative.    Genitourinary: Negative.    Musculoskeletal:         HPI   Skin: Negative.    Allergic/Immunologic: Negative.    Neurological: Negative.    Hematological: Negative.    Psychiatric/Behavioral: Negative.         Objective   /86 (BP Location: Right arm, Patient Position: Sitting, Cuff Size: Adult)   Pulse 88   Ht 5' 7\" (1.702 m)   Wt 97.1 kg (214 lb)   SpO2 98%   BMI 33.52 kg/m²      Physical Exam  Vitals and nursing note reviewed.   Constitutional:       Appearance: Normal appearance.   HENT:      Head: Normocephalic and atraumatic.      Mouth/Throat:      Mouth: Mucous membranes are moist. "   Eyes:      General: No scleral icterus.  Neck:      Vascular: No carotid bruit.   Cardiovascular:      Rate and Rhythm: Normal rate and regular rhythm.      Heart sounds: Normal heart sounds.   Pulmonary:      Effort: Pulmonary effort is normal.      Breath sounds: Normal breath sounds.   Musculoskeletal:         General: Tenderness present. No swelling or deformity.      Cervical back: Neck supple.      Right lower leg: No edema.      Left lower leg: No edema.      Comments: Mildly tender lateral malleolar area negative gross deformity   Skin:     General: Skin is warm and dry.      Findings: No bruising or erythema.   Neurological:      General: No focal deficit present.      Mental Status: She is alert.   Psychiatric:         Mood and Affect: Mood normal.

## 2025-03-12 NOTE — PATIENT INSTRUCTIONS
Take vitamin D 50,000 unit capsules twice weekly for 8 weeks  Check vitamin D level in 8 weeks, nonfasting  Follow-up with podiatry, Dr. Sosa for ankle pain  Return in 6 months for office visit sooner if needed

## 2025-03-12 NOTE — ASSESSMENT & PLAN NOTE
Prescription for vitamin D 50,000 units twice weekly for 8 weeks was given  Check vitamin D level 8 weeks  Orders:  •  ergocalciferol (VITAMIN D2) 50,000 units; Take 1 capsule twice weekly for 8 weeks  •  Vitamin D 25 hydroxy; Future

## 2025-04-21 DIAGNOSIS — M54.50 LOW BACK PAIN, UNSPECIFIED BACK PAIN LATERALITY, UNSPECIFIED CHRONICITY, UNSPECIFIED WHETHER SCIATICA PRESENT: ICD-10-CM

## 2025-04-21 DIAGNOSIS — M47.816 SPONDYLOSIS OF LUMBAR REGION WITHOUT MYELOPATHY OR RADICULOPATHY: ICD-10-CM

## 2025-04-21 RX ORDER — MELOXICAM 15 MG/1
15 TABLET ORAL DAILY PRN
Qty: 30 TABLET | Refills: 3 | Status: SHIPPED | OUTPATIENT
Start: 2025-04-21

## 2025-07-15 DIAGNOSIS — F32.0 CURRENT MILD EPISODE OF MAJOR DEPRESSIVE DISORDER WITHOUT PRIOR EPISODE (HCC): ICD-10-CM

## 2025-07-16 RX ORDER — ESCITALOPRAM OXALATE 10 MG/1
10 TABLET ORAL DAILY
Qty: 30 TABLET | Refills: 5 | Status: SHIPPED | OUTPATIENT
Start: 2025-07-16

## 2025-08-06 DIAGNOSIS — M62.830 MUSCLE SPASM OF BACK: ICD-10-CM

## 2025-08-16 DIAGNOSIS — M47.816 SPONDYLOSIS OF LUMBAR REGION WITHOUT MYELOPATHY OR RADICULOPATHY: ICD-10-CM

## 2025-08-16 DIAGNOSIS — M54.50 LOW BACK PAIN, UNSPECIFIED BACK PAIN LATERALITY, UNSPECIFIED CHRONICITY, UNSPECIFIED WHETHER SCIATICA PRESENT: ICD-10-CM

## 2025-08-18 RX ORDER — MELOXICAM 15 MG/1
15 TABLET ORAL DAILY PRN
Qty: 30 TABLET | Refills: 3 | Status: SHIPPED | OUTPATIENT
Start: 2025-08-18